# Patient Record
Sex: MALE | Race: WHITE | URBAN - NONMETROPOLITAN AREA
[De-identification: names, ages, dates, MRNs, and addresses within clinical notes are randomized per-mention and may not be internally consistent; named-entity substitution may affect disease eponyms.]

---

## 2017-03-31 ENCOUNTER — TELEPHONE (OUTPATIENT)
Dept: NEUROLOGY | Age: 62
End: 2017-03-31

## 2017-04-05 ENCOUNTER — OFFICE VISIT (OUTPATIENT)
Dept: NEUROLOGY | Age: 62
End: 2017-04-05
Payer: COMMERCIAL

## 2017-04-05 ENCOUNTER — TELEPHONE (OUTPATIENT)
Dept: NEUROLOGY | Age: 62
End: 2017-04-05

## 2017-04-05 VITALS
HEART RATE: 76 BPM | HEIGHT: 68 IN | BODY MASS INDEX: 31.67 KG/M2 | DIASTOLIC BLOOD PRESSURE: 89 MMHG | WEIGHT: 209 LBS | SYSTOLIC BLOOD PRESSURE: 137 MMHG

## 2017-04-05 DIAGNOSIS — H02.401 PTOSIS, RIGHT: ICD-10-CM

## 2017-04-05 DIAGNOSIS — E03.9 HYPOTHYROIDISM, UNSPECIFIED TYPE: Primary | ICD-10-CM

## 2017-04-05 DIAGNOSIS — R13.19 OTHER DYSPHAGIA: ICD-10-CM

## 2017-04-05 DIAGNOSIS — H53.2 DIPLOPIA: Primary | ICD-10-CM

## 2017-04-05 PROCEDURE — 99204 OFFICE O/P NEW MOD 45 MIN: CPT | Performed by: PSYCHIATRY & NEUROLOGY

## 2017-04-05 RX ORDER — ROSUVASTATIN CALCIUM 10 MG/1
10 TABLET, COATED ORAL DAILY
COMMUNITY

## 2017-04-05 RX ORDER — CITALOPRAM 40 MG/1
40 TABLET ORAL DAILY
COMMUNITY

## 2017-04-05 RX ORDER — FENOFIBRATE 145 MG/1
145 TABLET, COATED ORAL DAILY
COMMUNITY

## 2017-04-05 RX ORDER — LISINOPRIL AND HYDROCHLOROTHIAZIDE 12.5; 1 MG/1; MG/1
1 TABLET ORAL DAILY
COMMUNITY

## 2017-04-05 RX ORDER — PROPRANOLOL HYDROCHLORIDE 80 MG/1
80 CAPSULE, EXTENDED RELEASE ORAL DAILY
COMMUNITY

## 2017-04-05 RX ORDER — DOXYCYCLINE HYCLATE 150 MG/1
150 TABLET ORAL DAILY
COMMUNITY

## 2017-04-05 RX ORDER — PYRIDOSTIGMINE BROMIDE 60 MG/1
TABLET ORAL
Qty: 90 TABLET | Refills: 5 | Status: SHIPPED | OUTPATIENT
Start: 2017-04-05

## 2017-04-05 RX ORDER — FLUTICASONE PROPIONATE 50 MCG
2 SPRAY, SUSPENSION (ML) NASAL DAILY
COMMUNITY

## 2017-04-17 ENCOUNTER — TELEPHONE (OUTPATIENT)
Dept: NEUROLOGY | Age: 62
End: 2017-04-17

## 2020-05-21 ENCOUNTER — OFFICE VISIT (OUTPATIENT)
Dept: UROLOGY | Facility: CLINIC | Age: 65
End: 2020-05-21

## 2020-05-21 VITALS — WEIGHT: 204.4 LBS | TEMPERATURE: 98 F | HEIGHT: 68 IN | BODY MASS INDEX: 30.98 KG/M2

## 2020-05-21 DIAGNOSIS — N48.9 PENILE LESION: Primary | ICD-10-CM

## 2020-05-21 LAB
BILIRUB BLD-MCNC: NEGATIVE MG/DL
CLARITY, POC: CLEAR
COLOR UR: YELLOW
GLUCOSE UR STRIP-MCNC: NEGATIVE MG/DL
KETONES UR QL: NEGATIVE
LEUKOCYTE EST, POC: NEGATIVE
NITRITE UR-MCNC: NEGATIVE MG/ML
PH UR: 7 [PH] (ref 5–8)
PROT UR STRIP-MCNC: NEGATIVE MG/DL
RBC # UR STRIP: NEGATIVE /UL
SP GR UR: 1.02 (ref 1–1.03)
UROBILINOGEN UR QL: NORMAL

## 2020-05-21 PROCEDURE — 81003 URINALYSIS AUTO W/O SCOPE: CPT | Performed by: UROLOGY

## 2020-05-21 PROCEDURE — 99203 OFFICE O/P NEW LOW 30 MIN: CPT | Performed by: UROLOGY

## 2020-05-21 RX ORDER — FLUTICASONE PROPIONATE 50 MCG
2 SPRAY, SUSPENSION (ML) NASAL
COMMUNITY

## 2020-05-21 RX ORDER — LISINOPRIL AND HYDROCHLOROTHIAZIDE 12.5; 1 MG/1; MG/1
1 TABLET ORAL
COMMUNITY
Start: 2004-04-10

## 2020-05-21 RX ORDER — NYSTATIN AND TRIAMCINOLONE ACETONIDE 100000; 1 [USP'U]/G; MG/G
OINTMENT TOPICAL
Qty: 30 G | Refills: 1 | Status: SHIPPED | OUTPATIENT
Start: 2020-05-21

## 2020-05-21 RX ORDER — ERYTHROMYCIN 5 MG/G
OINTMENT OPHTHALMIC
COMMUNITY
Start: 2020-03-17

## 2020-05-21 RX ORDER — CITALOPRAM 40 MG/1
40 TABLET ORAL
COMMUNITY

## 2020-05-21 RX ORDER — PROPRANOLOL HYDROCHLORIDE 80 MG/1
80 CAPSULE, EXTENDED RELEASE ORAL
COMMUNITY
Start: 2004-03-20

## 2020-05-21 RX ORDER — ROSUVASTATIN CALCIUM 10 MG/1
10 TABLET, COATED ORAL
COMMUNITY

## 2020-05-21 NOTE — PROGRESS NOTES
"  Mr. Mcrae is 64 y.o. male    CHIEF COMPLAINT: I am here today, as a new patient, for a penile lesion.     LOYD Keller is here with a lesion of the penis.   He first noted it about a month ago.  He describes it as a flat red somewhat scaly lesion.  No urethral discharge.  He says it does feel \"irritated \"when his undergarments rub this.      The following portions of the patient's history were reviewed and updated as appropriate: allergies, current medications, past family history, past medical history, past social history, past surgical history and problem list.      Review of Systems   Constitutional: Negative for appetite change and fever.   HENT: Negative for hearing loss and sore throat.    Eyes: Negative for pain and redness.   Respiratory: Negative for cough and shortness of breath.    Cardiovascular: Negative for chest pain and leg swelling.   Gastrointestinal: Negative for anal bleeding, nausea and vomiting.   Endocrine: Negative for cold intolerance and heat intolerance.   Genitourinary: Negative for dysuria, flank pain, frequency, hematuria and urgency.   Musculoskeletal: Negative for joint swelling and myalgias.   Skin: Negative for color change and rash.   Allergic/Immunologic: Negative for immunocompromised state.   Neurological: Negative for dizziness and speech difficulty.   Hematological: Negative for adenopathy. Does not bruise/bleed easily.   Psychiatric/Behavioral: Negative for dysphoric mood and suicidal ideas.         Current Outpatient Medications:   •  lisinopril-hydrochlorothiazide (PRINZIDE,ZESTORETIC) 10-12.5 MG per tablet, Take 1 tablet by mouth., Disp: , Rfl:   •  propranolol LA (INDERAL LA) 80 MG 24 hr capsule, Take 80 mg by mouth., Disp: , Rfl:   •  Aspirin Buf,CaCarb-MgCarb-MgO, 81 MG tablet, Take 81 mg by mouth., Disp: , Rfl:   •  citalopram (CeleXA) 40 MG tablet, Take 40 mg by mouth., Disp: , Rfl:   •  erythromycin (ROMYCIN) 5 MG/GM ophthalmic ointment, APPLY TO AFFECTED EYE TWICE " "A DAY ON DAY 1 THEN ONCE DAILY FOR 4 MORE DAYS, Disp: , Rfl:   •  fluticasone (FLONASE) 50 MCG/ACT nasal spray, 2 sprays into the nostril(s) as directed by provider., Disp: , Rfl:   •  nystatin-triamcinolone (MYCOLOG) 411424-2.1 UNIT/GM-% ointment, Apply to affected area 2 times daily, Disp: 30 g, Rfl: 1  •  rosuvastatin (CRESTOR) 10 MG tablet, Take 10 mg by mouth., Disp: , Rfl:     Past Medical History:   Diagnosis Date   • High triglycerides    • Hypertension    • Kidney stone        Past Surgical History:   Procedure Laterality Date   • CERVICAL FUSION     • FOOT FRACTURE SURGERY         Social History     Socioeconomic History   • Marital status:      Spouse name: Not on file   • Number of children: Not on file   • Years of education: Not on file   • Highest education level: Not on file   Tobacco Use   • Smoking status: Never Smoker   • Smokeless tobacco: Never Used   Substance and Sexual Activity   • Alcohol use: Yes     Comment: occasional   • Sexual activity: Defer       Family History   Problem Relation Age of Onset   • Hypertension Father    • Hypertension Mother    • Cancer Mother          Temp 98 °F (36.7 °C)   Ht 172.7 cm (68\")   Wt 92.7 kg (204 lb 6.4 oz)   BMI 31.08 kg/m²       Physical Exam  Constitutional: Well nourished, Well developed; No apparent distress.  His vital signs are reviewed  Psychiatric: Appropriate affect; Alert and oriented  Eyes: Unremarkable  Musculoskeletal: Normal gait and station  GI: Abdomen is soft, non-tender  Respiratory: No distress; Unlabored movement; No accessory musculature needed with symmetric movements  Skin: No pallor or diaphoresis  ; Penis he has a flat erythematous area on the coronal rim of the penis on the right side.  I would estimate this to be about 8 mm.  It is not pustular, ulcerated or papillary.        Data  Results for orders placed or performed in visit on 05/21/20   POC Urinalysis Dipstick, Multipro   Result Value Ref Range    Color Yellow " Yellow, Straw, Dark Yellow, Ludivina    Clarity, UA Clear Clear    Glucose, UA Negative Negative, 1000 mg/dL (3+) mg/dL    Bilirubin Negative Negative    Ketones, UA Negative Negative    Specific Gravity  1.025 1.005 - 1.030    Blood, UA Negative Negative    pH, Urine 7.0 5.0 - 8.0    Protein, POC Negative Negative mg/dL    Urobilinogen, UA Normal Normal    Nitrite, UA Negative Negative    Leukocytes Negative Negative         Imaging Results (Last 7 Days)     ** No results found for the last 168 hours. **        International Prostate Symptom Score  The following is posted based on patient questionnaire answers:  0 - not at all    1-7 mild symptoms  1- Less than one time in five  8-19 moderate symptoms  2 -Less than half the time  20-35 severe symptoms  3 - About half the time  4 - More than half the time  5 - Almost always     For following sections:  Incomplete Emptying: - How often have you had the sensation  of not emptying your bladder completely after you finished urinating?  1  Frequency: -How often have you had to urinate again less than   two hours after you finished urinating?      1  Intermittency: -How often have you found you stopped and started again  Several times when you urinate?       2  Urgency: -How often do you find it difficult to postpone urination?             3  Weak stream: - How often have you had a weak urinary stream?             1  Straining: - How often have you had to push or strain to begin  Urination?          0  Sleeping: -How many times did you most typically get up to urinate   From the time you went to bed at night until the time you got up in the   2  Morning          Total `  10    Quality of Life  How would you feel if you had to live with your urinary condition the way   1  It is now, no better, no worse for the rest of your life?    Where: 0=delighted; 1= pleased, 2= mostly satisfied, 3= mixed, 4 = mostly  Dissatisfied, 5= Unhappy, 6 = terrible      Assessment and  Plan  Diagnoses and all orders for this visit:    Penile lesion  -     POC Urinalysis Dipstick, Multipro  -     nystatin-triamcinolone (MYCOLOG) 427979-9.1 UNIT/GM-% ointment; Apply to affected area 2 times daily    I will try him on an antifungal.  If this does not work in a couple weeks we will move to a stronger steroid.  Then if not seeing improvement when I examined him in a month I will probably refer him to a dermatologist.    (Please note that portions of this note were completed with a voice recognition program.)  Spencer Rizzo MD  05/21/20  08:22